# Patient Record
Sex: MALE | Race: WHITE | HISPANIC OR LATINO | Employment: OTHER | URBAN - METROPOLITAN AREA
[De-identification: names, ages, dates, MRNs, and addresses within clinical notes are randomized per-mention and may not be internally consistent; named-entity substitution may affect disease eponyms.]

---

## 2017-08-22 ENCOUNTER — PATIENT OUTREACH (OUTPATIENT)
Dept: ADMINISTRATIVE | Facility: HOSPITAL | Age: 42
End: 2017-08-22

## 2017-08-22 NOTE — PROGRESS NOTES
Attempting to clarify/update PCP. Unable to reach pt. LVM. Letter Sent. I spoke to patients ex-wife now and she gave me pt's new number. [phone]. Ex-wife's information was removed from chart at her request.

## 2017-08-22 NOTE — LETTER
August 22, 2017    Gregg Silva  [address]  [facility] [address]             [facility]  [address]  [facility] [address]  Phone: [phone] Dear Mr. Silva:    [facility] currently has John Montgomery MD  listed as your Primary Care Physician. Our records indicate that you have never established care with John Montgomery MD or have not been seen in 2 years or more.      To update your information, please contact your insurance company and request   your primary care physician be updated to reflect the [facility].      If you are a current [facility] patient and the listed physician is correct, please   call [phone] to update your information and re-establish care with John Montgomery MD.    Please disregard this letter if you have already contacted our office.    Thank you,   Gretchen PEREZ LPN  Care Coordination Department  [facility], [facility], [facility]  Phone Number: [phone]